# Patient Record
Sex: MALE | Race: WHITE | Employment: OTHER | URBAN - METROPOLITAN AREA
[De-identification: names, ages, dates, MRNs, and addresses within clinical notes are randomized per-mention and may not be internally consistent; named-entity substitution may affect disease eponyms.]

---

## 2017-03-23 ENCOUNTER — OFFICE VISIT (OUTPATIENT)
Dept: ORTHOPEDIC SURGERY | Age: 57
End: 2017-03-23

## 2017-03-23 VITALS
TEMPERATURE: 96.8 F | DIASTOLIC BLOOD PRESSURE: 66 MMHG | HEIGHT: 72 IN | SYSTOLIC BLOOD PRESSURE: 112 MMHG | HEART RATE: 82 BPM | BODY MASS INDEX: 30.48 KG/M2 | WEIGHT: 225 LBS | RESPIRATION RATE: 19 BRPM

## 2017-03-23 DIAGNOSIS — R20.0 LEG NUMBNESS: ICD-10-CM

## 2017-03-23 DIAGNOSIS — M54.50 NONSPECIFIC PAIN IN THE LUMBAR REGION: ICD-10-CM

## 2017-03-23 DIAGNOSIS — M54.42 ACUTE LEFT-SIDED LOW BACK PAIN WITH LEFT-SIDED SCIATICA: Primary | ICD-10-CM

## 2017-03-23 RX ORDER — METHYLPREDNISOLONE 4 MG/1
4 TABLET ORAL
COMMUNITY
End: 2017-05-11

## 2017-03-23 RX ORDER — TRIAMCINOLONE ACETONIDE 1 MG/G
CREAM TOPICAL
COMMUNITY
Start: 2017-02-08

## 2017-03-23 RX ORDER — ESCITALOPRAM OXALATE 5 MG/1
5 TABLET ORAL DAILY
COMMUNITY
Start: 2017-03-21

## 2017-03-23 RX ORDER — GABAPENTIN 400 MG/1
400 CAPSULE ORAL
COMMUNITY
Start: 2017-02-16

## 2017-03-23 RX ORDER — FLUTICASONE PROPIONATE 50 MCG
2 SPRAY, SUSPENSION (ML) NASAL
COMMUNITY
Start: 2017-02-16

## 2017-03-23 RX ORDER — GABAPENTIN 300 MG/1
CAPSULE ORAL
COMMUNITY
Start: 2017-01-20 | End: 2017-05-11

## 2017-03-23 NOTE — PATIENT INSTRUCTIONS
Sciatica: Care Instructions  Your Care Instructions    Sciatica (say \"zeq-ZO-nx-kuh\") is an irritation of one of the sciatic nerves, which come from the spinal cord in the lower back. The sciatic nerves and their branches extend down through the buttock to the foot. Sciatica can develop when an injured disc in the back presses against a spinal nerve root. Its main symptom is pain, numbness, or weakness that is often worse in the leg or foot than in the back. Sciatica often will improve and go away with time. Early treatment usually includes medicines and exercises to relieve pain. Follow-up care is a key part of your treatment and safety. Be sure to make and go to all appointments, and call your doctor if you are having problems. It's also a good idea to know your test results and keep a list of the medicines you take. How can you care for yourself at home? · Take pain medicines exactly as directed. ¨ If the doctor gave you a prescription medicine for pain, take it as prescribed. ¨ If you are not taking a prescription pain medicine, ask your doctor if you can take an over-the-counter medicine. · Use heat or ice to relieve pain. ¨ To apply heat, put a warm water bottle, heating pad set on low, or warm cloth on your back. Do not go to sleep with a heating pad on your skin. ¨ To use ice, put ice or a cold pack on the area for 10 to 20 minutes at a time. Put a thin cloth between the ice and your skin. · Avoid sitting if possible, unless it feels better than standing. · Alternate lying down with short walks. Increase your walking distance as you are able to without making your symptoms worse. · Do not do anything that makes your symptoms worse. When should you call for help? Call 911 anytime you think you may need emergency care. For example, call if:  · You are unable to move a leg at all.   Call your doctor now or seek immediate medical care if:  · You have new or worse symptoms in your legs or buttocks. Symptoms may include:  ¨ Numbness or tingling. ¨ Weakness. ¨ Pain. · You lose bladder or bowel control. Watch closely for changes in your health, and be sure to contact your doctor if:  · You are not getting better as expected. Where can you learn more? Go to http://cookie-mario.info/. Enter [phone] in the search box to learn more about \"Sciatica: Care Instructions. \"  Current as of: May 23, 2016  Content Version: 11.1  © 1554-7418 [employer]. Care instructions adapted under license by [employer] (which disclaims liability or warranty for this information). If you have questions about a medical condition or this instruction, always ask your healthcare professional. [address] any warranty or liability for your use of this information.

## 2017-03-23 NOTE — MR AVS SNAPSHOT
Visit Information Date & Time Provider Department Dept. Phone Encounter #  
 3/23/2017 10:00 AM Isrrael Paul MD [facility] [phone] [id number] Follow-up Instructions Return for MRI/CT f/u. Upcoming Health Maintenance Date Due Hepatitis C Screening 1960 HEMOGLOBIN A1C Q6M 1960 LIPID PANEL Q1 1960 FOOT EXAM Q1 7/21/1969 MICROALBUMIN Q1 7/21/1969 EYE EXAM RETINAL OR DILATED Q1 7/21/1969 Pneumococcal 19-64 Medium Risk (1 of 1 - PPSV23) 7/21/1978 DTaP/Tdap/Td series (1 - Tdap) 7/21/1980 FOBT Q 1 YEAR AGE 50-75 7/21/2009 INFLUENZA AGE 9 TO ADULT 8/1/2016 Allergies as of 3/23/2017  Review Complete On: 3/23/2017 By: Alix Evans LPN Severity Noted Reaction Type Reactions Morphine Medium 04/30/2013   Side Effect Other (comments) Flushing in the face and increases the blood pressure Current Immunizations  Never Reviewed No immunizations on file. Not reviewed this visit You Were Diagnosed With   
  
 Codes Comments Acute left-sided low back pain with left-sided sciatica    -  Primary ICD-10-CM: M54.42 
ICD-9-CM: 724.2, 724.3 Nonspecific pain in the lumbar region     ICD-10-CM: M54.5 ICD-9-CM: 724.2 Vitals BP Pulse Temp Resp Height(growth percentile) Weight(growth percentile) 112/66 (BP 1 Location: Left arm, BP Patient Position: Sitting) 82 96.8 °F (36 °C) (Oral) 19 6' (1.829 m) 225 lb (102.1 kg) BMI Smoking Status 30.52 kg/m2 Former Smoker BMI and BSA Data Body Mass Index Body Surface Area 30.52 kg/m 2 2.28 m 2 Preferred Pharmacy Pharmacy Name Phone [facility] [phone] Your Updated Medication List  
  
   
This list is accurate as of: 3/23/17 10:35 AM.  Always use your most recent med list.  
  
  
  
  
 aspirin 81 mg tablet Take 81 mg by mouth daily. buPROPion  mg SR tablet Commonly known as:  Sheri [place]oka Take 150 mg by mouth daily. ergocalciferol 50,000 unit capsule Commonly known as:  ERGOCALCIFEROL Take 50,000 Units by mouth every seven (7) days. escitalopram oxalate 5 mg tablet Commonly known as:  LEXAPRO  
  
 fluticasone 50 mcg/actuation nasal spray Commonly known as:  FLONASE  
  
 * gabapentin 300 mg capsule Commonly known as:  NEURONTIN  
  
 * gabapentin 400 mg capsule Commonly known as:  NEURONTIN  
  
 ibuprofen 800 mg tablet Commonly known as:  MOTRIN Take 800 mg by mouth every eight (8) hours as needed. * LIPITOR 40 mg tablet Generic drug:  atorvastatin Take 80 mg by mouth nightly. * atorvastatin 80 mg tablet Commonly known as:  LIPITOR Take 80 mg by mouth daily. lisinopril 5 mg tablet Commonly known as:  Jenny Loss Take 5 mg by mouth daily. LOPID 600 mg tablet Generic drug:  gemfibrozil Take 600 mg by mouth two (2) times a day. meloxicam 15 mg tablet Commonly known as:  MOBIC Take 15 mg by mouth daily. metFORMIN 500 mg tablet Commonly known as:  GLUCOPHAGE Take 500 mg by mouth two (2) times daily (with meals). methylPREDNISolone 4 mg Tab Commonly known as:  MEDROL  
4 mg.  
  
 metoprolol tartrate 25 mg tablet Commonly known as:  LOPRESSOR Take 25 mg by mouth daily. multivitamin tablet Commonly known as:  ONE A DAY Take 1 Tab by mouth daily. oxyCODONE IR 5 mg immediate release tablet Commonly known as:  Nancy Canal Take 2 Tabs by mouth every four (4) hours as needed for Pain. PERCOCET 7.5-325 mg per tablet Generic drug:  oxyCODONE-acetaminophen Take 1 Tab by mouth every six (6) hours as needed. tadalafil 5 mg tablet Commonly known as:  CIALIS Take 1 Tab by mouth as needed.  Indications: ERECTILE DYSFUNCTION, SYMPTOMATIC BENIGN PROSTATIC HYPERPLASIA, alpha blockers not effective for relief of prostate sx  
  
 tamsulosin 0.4 mg capsule Commonly known as:  FLOMAX Take 1 Cap by mouth daily. triamcinolone acetonide 0.1 % topical cream  
Commonly known as:  KENALOG * Notice: This list has 4 medication(s) that are the same as other medications prescribed for you. Read the directions carefully, and ask your doctor or other care provider to review them with you. We Performed the Following AMB POC XRAY, SPINE, LUMBOSACRAL; 4+ [id number] CPT(R)] Follow-up Instructions Return for MRI/CT f/u. To-Do List   
 03/23/2017 Imaging:  MRI LUMB SPINE WO CONT Referral Information Referral ID Referred By Referred To  
  
 [id number] Gaudencio Mor Not Available Visits Status Start Date End Date 1 New Request 3/23/17 3/23/18 If your referral has a status of pending review or denied, additional information will be sent to support the outcome of this decision. Patient Instructions Sciatica: Care Instructions Your Care Instructions Sciatica (say \"lxv-AQ-kz-kuh\") is an irritation of one of the sciatic nerves, which come from the spinal cord in the lower back. The sciatic nerves and their branches extend down through the buttock to the foot. Sciatica can develop when an injured disc in the back presses against a spinal nerve root. Its main symptom is pain, numbness, or weakness that is often worse in the leg or foot than in the back. Sciatica often will improve and go away with time. Early treatment usually includes medicines and exercises to relieve pain. Follow-up care is a key part of your treatment and safety. Be sure to make and go to all appointments, and call your doctor if you are having problems. It's also a good idea to know your test results and keep a list of the medicines you take. How can you care for yourself at home? · Take pain medicines exactly as directed. ¨ If the doctor gave you a prescription medicine for pain, take it as prescribed. ¨ If you are not taking a prescription pain medicine, ask your doctor if you can take an over-the-counter medicine. · Use heat or ice to relieve pain. ¨ To apply heat, put a warm water bottle, heating pad set on low, or warm cloth on your back. Do not go to sleep with a heating pad on your skin. ¨ To use ice, put ice or a cold pack on the area for 10 to 20 minutes at a time. Put a thin cloth between the ice and your skin. · Avoid sitting if possible, unless it feels better than standing. · Alternate lying down with short walks. Increase your walking distance as you are able to without making your symptoms worse. · Do not do anything that makes your symptoms worse. When should you call for help? Call 911 anytime you think you may need emergency care. For example, call if: 
· You are unable to move a leg at all. Call your doctor now or seek immediate medical care if: 
· You have new or worse symptoms in your legs or buttocks. Symptoms may include: ¨ Numbness or tingling. ¨ Weakness. ¨ Pain. · You lose bladder or bowel control. Watch closely for changes in your health, and be sure to contact your doctor if: 
· You are not getting better as expected. Where can you learn more? Go to http://cookie-mario.info/. Enter [phone] in the search box to learn more about \"Sciatica: Care Instructions. \" Current as of: May 23, 2016 Content Version: 11.1 © 6325-6055 [employer]. Care instructions adapted under license by [employer] (which disclaims liability or warranty for this information). If you have questions about a medical condition or this instruction, always ask your healthcare professional. [address] any warranty or liability for your use of this information. Introducing [facility]! Dear Alejandro Arevalo: Thank you for requesting a [employer] account. Our records indicate that you already have an active [employer] account. You can access your account anytime at [email or web address] Did you know that you can access your hospital and ER discharge instructions at any time in [employer]? You can also review all of your test results from your hospital stay or ER visit. Additional Information If you have questions, please visit the Frequently Asked Questions section of the [employer] website at [email or web address] Remember, [employer] is NOT to be used for urgent needs. For medical emergencies, dial 911. Now available from your iPhone and Android! Please provide this summary of care documentation to your next provider. If you have any questions after today's visit, please call [phone].

## 2017-03-23 NOTE — PROGRESS NOTES
[address]  [address], [address]  [place]  Phone: [phone]  Fax: [phone]        Oral Cano  : 1960  PCP: No primary care provider on file. NEW PATIENT      ASSESSMENT AND PLAN     Casper Lieberman was seen today for back pain. Diagnoses and all orders for this visit:    Acute left-sided low back pain with left-sided sciatica, recurrent  -     MRI LUMB SPINE WO CONT; Future    Nonspecific pain in the lumbar region  -     [92808] LS Spine 4V    Leg numbness    1. Lumbar spine MRI. 2. Discussed fall prevention with pt.   3. Will obtain EMG from Dr. Alix Rg, has some symptoms of peroneal neuropathy. Follow-up Disposition:  Return for MRI/CT f/u. CHIEF COMPLAINT  Nevin Dougherty is seen today in consultation as a self referral for complaints of increasing back pain. HISTORY OF PRESENT ILLNESS  Nevin Dougherty is a 61 y.o. male. Pt was last seen by me in 2013. In 2013 he had a LT L4-5 laminectomy. His back pain started to increase for the past 2-3 months. Pt reports that he has been experiencing increasing paresthesia in his LLE. His paresthesia is mainly felt below his knee. He states that his back pain is starting to increase as well. He continues to have a drop foot in his LT foot. He wears an AFO sometimes. 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 He has suffered from a fall when his LT foot did not come up and it caught on a step. He states that he has been tripping more often and notes that his LT foot has been catching more often as well. He states that if he looks over his shoulder and picks up his RT foot, his back pain will shoot to a 8-9/10. Pt states that he will pick his LT leg up higher to prevent his foot from catching. When he gets tired, his foot will drag more often. No saddle paresthesia.      Pt takes Gabapentin 400 mg that he takes for anxiety and panic attacks. Pt was told that there is no way he can have PTSD as he has never been to [place], now recently diagnosed with PTSD. Dagoberto Saldivar He will suffer from panic attacks with watching explosions in movies or loud noises. Pt has been on Gabapentin for about 6 months. He has not noticed a difference in his symptoms since starting Gabapentin. He has had an EMG of his LLE by Dr. Janice Ulloa, unsure of results-?chronic . He has not been given Prednisone since the onset of his increased pain and paresthesias. Denies persistent fevers, chills, weight changes, neurogenic bowel or bladder symptoms. Pt denies recent ED visits or hospitalizations. Pt has had a shoulder reconstruction. Pain Assessment  3/23/2017   Location of Pain Back   Severity of Pain 3   Quality of Pain Aching   Frequency of Pain Constant   Aggravating Factors Bending;Standing;Squatting   Relieving Factors Nothing         PAST MEDICAL HISTORY   Past Medical History:   Diagnosis Date    Acid indigestion     Chronic pain     arthritis    Diabetes ([address]     Esophageal hiatal hernia     GERD (gastroesophageal reflux disease)     Hypercholesteremia     Hypertension     Unspecified sleep apnea     had surgery for this       Past Surgical History:   Procedure Laterality Date    HX BACK SURGERY Left 05/2013    LL4/5 drakei Dr. Anahy Gibbons    HX HEENT      U triple 3 tonsillectomy and adnoidectomy    HX HEENT      2 deviated septum surgeries    HX OTHER SURGICAL      2nd grade had lymph node removed       MEDICATIONS      Current Outpatient Prescriptions   Medication Sig Dispense Refill    escitalopram oxalate (LEXAPRO) 5 mg tablet       fluticasone (FLONASE) 50 mcg/actuation nasal spray       gabapentin (NEURONTIN) 400 mg capsule       ergocalciferol (ERGOCALCIFEROL) 50,000 unit capsule Take 50,000 Units by mouth every seven (7) days.  atorvastatin (LIPITOR) 80 mg tablet Take 80 mg by mouth daily.       multivitamin (ONE A DAY) tablet Take 1 Tab by mouth daily.  tadalafil (CIALIS) 5 mg tablet Take 1 Tab by mouth as needed. Indications: ERECTILE DYSFUNCTION, SYMPTOMATIC BENIGN PROSTATIC HYPERPLASIA, alpha blockers not effective for relief of prostate sx 30 Tab 11    buPROPion SR (WELLBUTRIN SR) 150 mg SR tablet Take 150 mg by mouth daily.  tamsulosin (FLOMAX) 0.4 mg capsule Take 1 Cap by mouth daily. 30 Cap 3    metoprolol (LOPRESSOR) 25 mg tablet Take 25 mg by mouth daily.  metFORMIN (GLUCOPHAGE) 500 mg tablet Take 500 mg by mouth two (2) times daily (with meals).  lisinopril (PRINIVIL, ZESTRIL) 5 mg tablet Take 5 mg by mouth daily.  aspirin 81 mg tablet Take 81 mg by mouth daily.  ibuprofen (MOTRIN) 800 mg tablet Take 800 mg by mouth every eight (8) hours as needed.  methylPREDNISolone (MEDROL) 4 mg tab 4 mg.  gabapentin (NEURONTIN) 300 mg capsule       triamcinolone acetonide (KENALOG) 0.1 % topical cream       meloxicam (MOBIC) 15 mg tablet Take 15 mg by mouth daily.  oxyCODONE IR (ROXICODONE) 5 mg immediate release tablet Take 2 Tabs by mouth every four (4) hours as needed for Pain. 40 Tab 0    gemfibrozil (LOPID) 600 mg tablet Take 600 mg by mouth two (2) times a day.  atorvastatin (LIPITOR) 40 mg tablet Take 80 mg by mouth nightly.  oxyCODONE-acetaminophen (PERCOCET) 7.5-325 mg per tablet Take 1 Tab by mouth every six (6) hours as needed. ALLERGIES    Allergies   Allergen Reactions    Morphine Other (comments)     Flushing in the face and increases the blood pressure          SOCIAL HISTORY    Social History     Social History    Marital status:      Spouse name: N/A    Number of children: N/A    Years of education: N/A     Occupational History    Not on file.      Social History Main Topics    Smoking status: Former Smoker     Years: 25.00     Quit date: 8/10/2015    Smokeless tobacco: Never Used    Alcohol use Yes      Comment: occasionally    Drug use: No    Sexual activity: Not on file     Other Topics Concern    Not on file     Social History Narrative       FAMILY HISTORY    Family History   Problem Relation Age of Onset    Cancer Mother     Stroke Neg Hx     Heart Surgery Neg Hx     Heart Attack Neg Hx     Hypertension Neg Hx          REVIEW OF SYSTEMS  Review of Systems   Constitutional: Negative for chills, fever and weight loss. Respiratory: Negative for shortness of breath. Cardiovascular: Negative for chest pain. Gastrointestinal: Negative for constipation. Negative for fecal incontinence   Genitourinary: Negative for dysuria. Negative for urinary incontinence   Musculoskeletal:        Per HPI   Skin: Negative for rash. Neurological: Positive for tingling and focal weakness. Negative for dizziness, tremors and headaches. Endo/Heme/Allergies: Does not bruise/bleed easily. Psychiatric/Behavioral: The patient does not have insomnia. PHYSICAL EXAMINATION  Visit Vitals    /66 (BP 1 Location: Left arm, BP Patient Position: Sitting)    Pulse 82    Temp 96.8 °F (36 °C) (Oral)    Resp 19    Ht 6' (1.829 m)    Wt 225 lb (102.1 kg)    BMI 30.52 kg/m2          Accompanied by self. Constitutional:  Well developed, well nourished, in no acute distress. Psychiatric: Affect and mood are appropriate. Integumentary: No rashes or abrasions noted on exposed areas. Cardiovascular/Peripheral Vascular: Intact l pulses. No peripheral edema is noted. Lymphatic:  No evidence of lymphedema. No cervical lymphadenopathy. SPINE/MUSCULOSKELETAL EXAM      Lumbar spine:  No rash, ecchymosis, or gross obliquity. No fasciculations. No focal atrophy is noted. Increased pain with LT lateral bending. No tenderness to palpation at the sciatic notch. SI joints non-tender. Trochanters non tender. Diminished sensation to light touch of lateral calf. Positive tinel's at LT fibular head.        MOTOR: Hip Flex  Quads Hamstrings Ankle DF EHL Ankle PF   Right +4/5 +4/5 +4/5 +4/5 +4/5 +4/5   Left +4/5 +4/5 +4/5 -4/5 3/5 +4/5     Straight Leg raise negative. LT foot drop. Toe rise intact    Ambulation without assistive device. FWB. RADIOGRAPHS  Lumbar spine xray films reviewed:  1) Mild Degenerative scoliosis  2) Disc space narrowing at L3-4 and L4-5  3) Facet changes at L3-4 and L4-5  4) No instability    Written by Robin Cruz, as dictated by Dedrick Patterson MD.    Mr. Leandra Hermosillo may have a reminder for a \"due or due soon\" health maintenance. I have asked that he contact his primary care provider for follow-up on this health maintenance.

## 2017-04-10 ENCOUNTER — HOSPITAL ENCOUNTER (OUTPATIENT)
Age: 57
Discharge: HOME | End: 2017-04-10
Attending: PHYSICAL MEDICINE & REHABILITATION
Payer: OTHER GOVERNMENT

## 2017-04-10 DIAGNOSIS — M54.42 ACUTE LEFT-SIDED LOW BACK PAIN WITH LEFT-SIDED SCIATICA: ICD-10-CM

## 2017-04-10 PROCEDURE — 72148 MRI LUMBAR SPINE W/O DYE: CPT

## 2017-05-11 ENCOUNTER — OFFICE VISIT (OUTPATIENT)
Dept: ORTHOPEDIC SURGERY | Age: 57
End: 2017-05-11

## 2017-05-11 VITALS
WEIGHT: 226 LBS | BODY MASS INDEX: 30.61 KG/M2 | DIASTOLIC BLOOD PRESSURE: 82 MMHG | HEART RATE: 75 BPM | SYSTOLIC BLOOD PRESSURE: 128 MMHG | HEIGHT: 72 IN

## 2017-05-11 DIAGNOSIS — M47.819 FACET ARTHROPATHY, MULTILEVEL: Primary | Chronic | ICD-10-CM

## 2017-05-11 RX ORDER — ETODOLAC 400 MG/1
TABLET, FILM COATED ORAL
Qty: 90 TAB | Refills: 0 | Status: SHIPPED | OUTPATIENT
Start: 2017-05-11 | End: 2017-06-11 | Stop reason: SDUPTHER

## 2017-05-11 NOTE — PATIENT INSTRUCTIONS
Learning About Medial Branch Block and Neurotomy  What are medial branch block and neurotomy? Facet joints connect your vertebrae to each other. Problems in these joints can cause chronic (long-term) pain in the neck or back. They can sometimes affect the shoulders, arms, buttocks, or legs. Medial branch nerves are the nerves that carry many of the pain messages from your facet joints. Radiofrequency medial branch neurotomy is a type of medial branch neurotomy that is used to relieve arthritis pain. It uses radio waves to damage nerves in your neck or back so that they can no longer send pain messages to your brain. Before your doctor knows if a neurotomy will help you, he or she will do a medial branch block to find out if certain nerves are the ones that are a source of your pain. You will need two separate visits to the outpatient center or hospital to have both procedures. How is a medial branch block done? The doctor will use a tiny needle to numb the skin where you will get the block. Then he or she puts the block needle into the numbed area. You may feel some pressure, but you should not feel pain. Using fluoroscopy (live X-ray) to guide the needle, the doctor injects medicine onto one or more nerves to make them numb. If you get relief from your pain in the next 4 to 6 hours, it's a sign that those nerves may be contributing to your pain. The relief will last only a short time. You may then have a medial branch neurotomy at a later visit to try to get longer relief. It takes 20 to 30 minutes to get the block. You can go home after the doctor watches you for about an hour. You will get instructions on how to report how much pain you have when you are at home. You will need someone to drive you home. How is medial branch neurotomy done? The doctor will use a tiny needle to numb the skin where you will get the neurotomy. Then he or she puts the neurotomy needle into the numbed area.  You may feel some pressure. Using fluoroscopy (live X-ray) to guide the needle, the doctor sends radio waves through the needle to the nerve for 60 to 90 seconds. The radio waves heat the nerve, which damages it. The doctor may do this several times. And he or she may treat more than one nerve. It takes 45 to 90 minutes to get a neurotomy, depending on how many nerves are heated. You will probably go home 30 to 60 minutes later. You will need someone to drive you home. What can you expect after a neurotomy? You may feel a little sore or tender at the injection site at first. But after a successful neurotomy, most people have pain relief right away. It often lasts for 9 to 12 months or longer. Sometimes the pain relief is permanent. If your pain does come back, it may mean that the damaged nerve has healed and can send pain messages again. Or it can mean that a different nerve is causing pain. Your doctor will discuss your options with you. Follow-up care is a key part of your treatment and safety. Be sure to make and go to all appointments, and call your doctor if you are having problems. It's also a good idea to know your test results and keep a list of the medicines you take. Where can you learn more? Go to http://cookie-mario.info/. Enter Z555 in the search box to learn more about \"Learning About Medial Branch Block and Neurotomy. \"  Current as of: October 14, 2016  Content Version: 11.2  © 0547-0671 "[employer] Care instructions adapted under license by [employer] (which disclaims liability or warranty for this information). If you have questions about a medical condition or this instruction, always ask your healthcare professional. [address] any warranty or liability for your use of this information. Low Back Arthritis: Exercises  Your Care Instructions  Here are some examples of typical rehabilitation exercises for your condition. Start each exercise slowly. Ease off the exercise if you start to have pain. Your doctor or physical therapist will tell you when you can start these exercises and which ones will work best for you. When you are not being active, find a comfortable position for rest. Some people are comfortable on the floor or a medium-firm bed with a small pillow under their head and another under their knees. Some people prefer to lie on their side with a pillow between their knees. Don't stay in one position for too long. Take short walks (10 to 20 minutes) every 2 to 3 hours. Avoid slopes, hills, and stairs until you feel better. Walk only distances you can manage without pain, especially leg pain. How to do the exercises  Pelvic tilt    1. Lie on your back with your knees bent. 2. \"Brace\" your stomach--tighten your muscles by pulling in and imagining your belly button moving toward your spine. 3. Press your lower back into the floor. You should feel your hips and pelvis rock back. 4. Hold for 6 seconds while breathing smoothly. 5. Relax and allow your pelvis and hips to rock forward. 6. Repeat 8 to 12 times. Back stretches    1. Get down on your hands and knees on the floor. 2. Relax your head and allow it to droop. Round your back up toward the ceiling until you feel a nice stretch in your upper, middle, and lower back. Hold this stretch for as long as it feels comfortable, or about 15 to 30 seconds. 3. Return to the starting position with a flat back while you are on your hands and knees. 4. Let your back sway by pressing your stomach toward the floor. Lift your buttocks toward the ceiling. 5. Hold this position for 15 to 30 seconds. 6. Repeat 2 to 4 times. Follow-up care is a key part of your treatment and safety. Be sure to make and go to all appointments, and call your doctor if you are having problems. It's also a good idea to know your test results and keep a list of the medicines you take.   Where can you learn more? Go to http://cookie-mario.info/. Enter M056 in the search box to learn more about \"Low Back Arthritis: Exercises. \"  Current as of: May 23, 2016  Content Version: 11.2  © 7412-5100 [employer]. Care instructions adapted under license by [employer] (which disclaims liability or warranty for this information). If you have questions about a medical condition or this instruction, always ask your healthcare professional. [address] any warranty or liability for your use of this information. Low Back Pain: Exercises  Your Care Instructions  Here are some examples of typical rehabilitation exercises for your condition. Start each exercise slowly. Ease off the exercise if you start to have pain. Your doctor or physical therapist will tell you when you can start these exercises and which ones will work best for you. How to do the exercises  Press-up    7. Lie on your stomach, supporting your body with your forearms. 8. Press your elbows down into the floor to raise your upper back. As you do this, relax your stomach muscles and allow your back to arch without using your back muscles. As your press up, do not let your hips or pelvis come off the floor. 9. Hold for 15 to 30 seconds, then relax. 10. Repeat 2 to 4 times. Alternate arm and leg (bird dog) exercise    Note: Do this exercise slowly. Try to keep your body straight at all times, and do not let one hip drop lower than the other. 7. Start on the floor, on your hands and knees. 8. Tighten your belly muscles. 9. Raise one leg off the floor, and hold it straight out behind you. Be careful not to let your hip drop down, because that will twist your trunk. 10. Hold for about 6 seconds, then lower your leg and switch to the other leg. 11. Repeat 8 to 12 times on each leg. 12. Over time, work up to holding for 10 to 30 seconds each time.   13. If you feel stable and secure with your leg raised, try raising the opposite arm straight out in front of you at the same time. Knee-to-chest exercise    1. Lie on your back with your knees bent and your feet flat on the floor. 2. Bring one knee to your chest, keeping the other foot flat on the floor (or keeping the other leg straight, whichever feels better on your lower back). 3. Keep your lower back pressed to the floor. Hold for at least 15 to 30 seconds. 4. Relax, and lower the knee to the starting position. 5. Repeat with the other leg. Repeat 2 to 4 times with each leg. 6. To get more stretch, put your other leg flat on the floor while pulling your knee to your chest.  Curl-ups    1. Lie on the floor on your back with your knees bent at a 90-degree angle. Your feet should be flat on the floor, about 12 inches from your buttocks. 2. Cross your arms over your chest. If this bothers your neck, try putting your hands behind your neck (not your head), with your elbows spread apart. 3. Slowly tighten your belly muscles and raise your shoulder blades off the floor. 4. Keep your head in line with your body, and do not press your chin to your chest.  5. Hold this position for 1 or 2 seconds, then slowly lower yourself back down to the floor. 6. Repeat 8 to 12 times. Pelvic tilt exercise    1. Lie on your back with your knees bent. 2. \"Brace\" your stomach. This means to tighten your muscles by pulling in and imagining your belly button moving toward your spine. You should feel like your back is pressing to the floor and your hips and pelvis are rocking back. 3. Hold for about 6 seconds while you breathe smoothly. 4. Repeat 8 to 12 times. Heel dig bridging    1. Lie on your back with both knees bent and your ankles bent so that only your heels are digging into the floor. Your knees should be bent about 90 degrees.   2. Then push your heels into the floor, squeeze your buttocks, and lift your hips off the floor until your shoulders, hips, and knees are all in a straight line. 3. Hold for about 6 seconds as you continue to breathe normally, and then slowly lower your hips back down to the floor and rest for up to 10 seconds. 4. Do 8 to 12 repetitions. Hamstring stretch in doorway    1. Lie on your back in a doorway, with one leg through the open door. 2. Slide your leg up the wall to straighten your knee. You should feel a gentle stretch down the back of your leg. 3. Hold the stretch for at least 15 to 30 seconds. Do not arch your back, point your toes, or bend either knee. Keep one heel touching the floor and the other heel touching the wall. 4. Repeat with your other leg. 5. Do 2 to 4 times for each leg. Hip flexor stretch    1. Kneel on the floor with one knee bent and one leg behind you. Place your forward knee over your foot. Keep your other knee touching the floor. 2. Slowly push your hips forward until you feel a stretch in the upper thigh of your rear leg. 3. Hold the stretch for at least 15 to 30 seconds. Repeat with your other leg. 4. Do 2 to 4 times on each side. Wall sit    1. Stand with your back 10 to 12 inches away from a wall. 2. Lean into the wall until your back is flat against it. 3. Slowly slide down until your knees are slightly bent, pressing your lower back into the wall. 4. Hold for about 6 seconds, then slide back up the wall. 5. Repeat 8 to 12 times. Follow-up care is a key part of your treatment and safety. Be sure to make and go to all appointments, and call your doctor if you are having problems. It's also a good idea to know your test results and keep a list of the medicines you take. Where can you learn more? Go to http://cookie-mario.info/. Enter I793 in the search box to learn more about \"Low Back Pain: Exercises. \"  Current as of: May 23, 2016  Content Version: 11.2  © 5926-5637 [email or web address].  Care instructions adapted under license by [employer] (which disclaims liability or warranty for this information). If you have questions about a medical condition or this instruction, always ask your healthcare professional. [address] any warranty or liability for your use of this information.

## 2017-05-11 NOTE — PROGRESS NOTES
[address]  [address], [address]  [place]  Phone: [phone]  Fax: [phone]        Jerry Alonso  : 1960  PCP: PROVIDER UNKNOWN    PROGRESS NOTE      ASSESSMENT AND PLAN    Tori Reyna was seen today for back pain and follow-up. Diagnoses and all orders for this visit:    Facet arthropathy, multilevel ([address] L3/4 w/canal triangulation, L4/5    Other orders  -     etodolac (LODINE) 400 mg tablet; Take one tab PO every day to TID with food as needed for pain. 1. Trial of Lodine. 2. Continue Gabapentin. 3. Discussed urgent symptoms with pt and RFA. 4. Released from specialty care. Further refills through PCP. 5. Given information on RFA. Follow-up Disposition:  Return if symptoms worsen or fail to improve. HISTORY OF PRESENT ILLNESS  Ana Noe is a 61 y.o. male. Pt presents to the office for a f/u visit for LT sciatica. Last visit pt was sent to have a lumbar spine MRI. Images reviewed with the pt. Primarily facet changes. Pt repots that today his pain is mainly in his back. He does continue to have LT sciatic pain but his pain is most severe in his back today. Pt reports that he has had a LT drop foot that is not a new symptom for him as he has had this for a prolonged period of time. Pt reports that he does experience sharp shooting pain up his back. He also experiences pain in his RLE intermittently. Pt has difficulty with rising from a seated position. He has trouble with bending and rising from a bent position due to pain. Pt does have an AFO at home that he will use for his LLE weakness. He does not use his AFO often, is not wearing his AFO in the office today. He is unable to wear his AFO [place] on the [place] as he is unable to feel the ground when wearing. He does drag his LLE with ambulation when he is fatigued. No saddle paresthesia. He takes Gabapentin 400 mg TID .  He has not noticed any change in his pain since starting Gabapentin. Denies persistent fevers, chills, weight changes, neurogenic bowel or bladder symptoms. Pt denies recent ED visits or hospitalizations. Motrin no longer helpful for LBP    Pain Assessment  5/11/2017   Location of Pain -   Severity of Pain -   Quality of Pain Sharp   Frequency of Pain -   Aggravating Factors -   Limiting Behavior -   Relieving Factors -   Result of Injury -           MRI Results (most recent):    Results from Hospital Encounter encounter on 04/10/17   MRI LUMB SPINE WO CONT   Narrative Procedure: MRI of the lumbar spine without contrast.    CPT code: 44657    Comparisons: MRI dated March 30, 2013. Indications:  recurrent low back pain with paresthesias in the left lower  extremity    Technique: T1 weighted, T2 FSE with fat saturation, FSE inversion recovery  sagittal images are supplemented by T2 weighted with fat saturation and T1  weighted axial images. Findings:        Sagittal images reveal overall normal vertebral body morphology. No fractures  noted. No suspicious lesions. Alignments are anatomic, no evidence for subluxation. Conus medullaris ends at the L1 vertebral body level. Correlation of axial and sagittal images reveals the following: At T12-L1: No significant disc pathology. No significant facet arthropathy. No  central canal or foraminal stenosis. At L1-L2: Minimal circumferential disc bulge. No significant facet arthropathy. No central canal or foraminal stenosis. At L2-L3: Mild circumferential disc bulge with a right posterior lateral corner  annular tear. Minimal facet arthropathy. No central canal stenosis. Minimal  foraminal stenosis. At L3-L4: Mild to moderate circumferential disc bulge. Mild to moderate facet  arthropathy and buckling of the ligamentum flavum. No central canal stenosis. Mild to moderate left and mild right foraminal stenosis.     At L4-L5: Mild to moderate broad-based disc osteophyte complex with some annular  tearing centrally. Remote left laminectomy and partial facetectomy. The  ligamentum flavum on the right does flatten the thecal sac somewhat but probably  not symptomatic. No central canal stenosis by measurement of 14 mm. Moderate  right and mild left foraminal stenosis    At L5-S1: No significant disc pathology. No significant facet arthropathy. No  central canal or foraminal stenosis. Visualized portions of the sacroiliac joints are unremarkable. Probable simple  cyst not well visualized posterior mid left kidney. Incidentally imaged  retroperitoneal structures are otherwise unremarkable. Impression Impression:    Degenerative and remote postoperative changes as above. PAST MEDICAL HISTORY   Past Medical History:   Diagnosis Date    Acid indigestion     Chronic pain     arthritis    Diabetes ([address]     Esophageal hiatal hernia     GERD (gastroesophageal reflux disease)     Hypercholesteremia     Hypertension     Unspecified sleep apnea     had surgery for this       Past Surgical History:   Procedure Laterality Date    HX BACK SURGERY Left 05/2013    LL4/5 [facility]    HX HEENT      U triple 3 tonsillectomy and adnoidectomy    HX HEENT      2 deviated septum surgeries    HX OTHER SURGICAL      2nd grade had lymph node removed   . MEDICATIONS    Current Outpatient Prescriptions   Medication Sig Dispense Refill    escitalopram oxalate (LEXAPRO) 5 mg tablet Take 5 mg by mouth daily.  fluticasone (FLONASE) 50 mcg/actuation nasal spray 2 Sprays by Both Nostrils route.  gabapentin (NEURONTIN) 400 mg capsule Take 400 mg by mouth.  triamcinolone acetonide (KENALOG) 0.1 % topical cream       ergocalciferol (ERGOCALCIFEROL) 50,000 unit capsule Take 50,000 Units by mouth every seven (7) days.  atorvastatin (LIPITOR) 80 mg tablet Take 80 mg by mouth daily.  multivitamin (ONE A DAY) tablet Take 1 Tab by mouth daily.       tadalafil (CIALIS) 5 mg tablet Take 1 Tab by mouth as needed. Indications: ERECTILE DYSFUNCTION, SYMPTOMATIC BENIGN PROSTATIC HYPERPLASIA, alpha blockers not effective for relief of prostate sx 30 Tab 11    buPROPion SR (WELLBUTRIN SR) 150 mg SR tablet Take 150 mg by mouth daily.  tamsulosin (FLOMAX) 0.4 mg capsule Take 1 Cap by mouth daily. 30 Cap 3    metoprolol (LOPRESSOR) 25 mg tablet Take 25 mg by mouth daily.  metFORMIN (GLUCOPHAGE) 500 mg tablet Take 500 mg by mouth two (2) times daily (with meals).  gemfibrozil (LOPID) 600 mg tablet Take 600 mg by mouth two (2) times a day.  lisinopril (PRINIVIL, ZESTRIL) 5 mg tablet Take 5 mg by mouth daily.  aspirin 81 mg tablet Take 81 mg by mouth daily.  ibuprofen (MOTRIN) 800 mg tablet Take 800 mg by mouth every eight (8) hours as needed.  methylPREDNISolone (MEDROL) 4 mg tab 4 mg.  gabapentin (NEURONTIN) 300 mg capsule       meloxicam (MOBIC) 15 mg tablet Take 15 mg by mouth daily.  oxyCODONE IR (ROXICODONE) 5 mg immediate release tablet Take 2 Tabs by mouth every four (4) hours as needed for Pain. 40 Tab 0    atorvastatin (LIPITOR) 40 mg tablet Take 80 mg by mouth nightly.  oxyCODONE-acetaminophen (PERCOCET) 7.5-325 mg per tablet Take 1 Tab by mouth every six (6) hours as needed. ALLERGIES  Allergies   Allergen Reactions    Morphine Other (comments)     Flushing in the face and increases the blood pressure          SOCIAL HISTORY    Social History     Social History    Marital status:      Spouse name: N/A    Number of children: N/A    Years of education: N/A     Occupational History    Not on file.      Social History Main Topics    Smoking status: Former Smoker     Years: 25.00     Quit date: 8/10/2015    Smokeless tobacco: Never Used    Alcohol use Yes      Comment: occasionally    Drug use: No    Sexual activity: Not on file     Other Topics Concern    Not on file     Social History Narrative FAMILY HISTORY  Family History   Problem Relation Age of Onset    Cancer Mother     Stroke Neg Hx     Heart Surgery Neg Hx     Heart Attack Neg Hx     Hypertension Neg Hx        REVIEW OF SYSTEMS  Review of Systems   Constitutional: Negative for chills, fever and weight loss. Respiratory: Negative for shortness of breath. Cardiovascular: Negative for chest pain. Gastrointestinal: Negative for constipation. Negative for fecal incontinence   Genitourinary: Negative for dysuria. Negative for urinary incontinence   Musculoskeletal:        Per HPI   Skin: Negative for rash. Neurological: Positive for focal weakness. Negative for dizziness, tingling, tremors and headaches. Endo/Heme/Allergies: Does not bruise/bleed easily. Psychiatric/Behavioral: The patient does not have insomnia. PHYSICAL EXAMINATION  Visit Vitals    /82    Pulse 75    Ht 6' (1.829 m)    Wt 226 lb (102.5 kg)    BMI 30.65 kg/m2         Accompanied by self. Constitutional:  Well developed, well nourished, in no acute distress. Psychiatric: Affect and mood are appropriate. Integumentary: No rashes or abrasions noted on exposed areas. Cardiovascular/Peripheral Vascular: Intact l pulses. No peripheral edema is noted. Lymphatic:  No evidence of lymphedema. No cervical lymphadenopathy. SPINE/MUSCULOSKELETAL EXAM      Lumbar spine:  No rash, ecchymosis, or gross obliquity. No fasciculations. No focal atrophy is noted. Pain with lumbar extension and RT lateral twisting. Tenderness to palpation on the RT at L3-4 and L4-5. No tenderness to palpation at the sciatic notch. SI joints non-tender. Trochanters non tender. Sensation grossly intact to light touch. MOTOR:       Hip Flex  Quads Hamstrings Ankle DF EHL Ankle PF   Right +4/5 +4/5 +4/5 +4/5 +4/5 +4/5   Left +4/5 +4/5 +4/5 4/5 4/5 +4/5       Straight Leg raise negative. Mild difficulty with tandem gait.      Ambulation without assistive device. FWB. Written by Sofía Sanches, as dictated by Sasha Ziegler MD.    I, Dr. Sasha Ziegler MD, confirm that all documentation is accurate. Mr. Moises Grant may have a reminder for a \"due or due soon\" health maintenance. I have asked that he contact his primary care provider for follow-up on this health maintenance.

## 2017-05-11 NOTE — PROGRESS NOTES
Verbal order entered per Dr Carolynn Noriega as documented on blue sheet: Lodine 400mg take one tab PO every day to TID with food as needed for pain.  Disp 90 no refills

## 2017-05-11 NOTE — MR AVS SNAPSHOT
Visit Information Date & Time Provider Department Dept. Phone Encounter #  
 5/11/2017  8:50 AM Miryam Rico MD [facility] [phone] [id number] Follow-up Instructions Return if symptoms worsen or fail to improve. Upcoming Health Maintenance Date Due Hepatitis C Screening 1960 HEMOGLOBIN A1C Q6M 1959 LIPID PANEL Q1 1959 FOOT EXAM Q1 7/21/1969 MICROALBUMIN Q1 7/21/1969 EYE EXAM RETINAL OR DILATED Q1 7/21/1969 Pneumococcal 19-64 Medium Risk (1 of 1 - PPSV23) 7/21/1978 DTaP/Tdap/Td series (1 - Tdap) 7/21/1980 FOBT Q 1 YEAR AGE 50-75 7/21/2009 INFLUENZA AGE 9 TO ADULT 8/1/2017 Allergies as of 5/11/2017  Review Complete On: 5/11/2017 By: Miryam Rico MD  
  
 Severity Noted Reaction Type Reactions Morphine Medium 04/30/2013   Side Effect Other (comments) Flushing in the face and increases the blood pressure Current Immunizations  Never Reviewed No immunizations on file. Not reviewed this visit You Were Diagnosed With   
  
 Codes Comments Facet arthropathy, multilevel ([facility]    -  Primary ICD-10-CM: M12.88 ICD-9-CM: 721.90 Vitals BP Pulse Height(growth percentile) Weight(growth percentile) BMI Smoking Status 128/82 75 6' (1.829 m) 226 lb (102.5 kg) 30.65 kg/m2 Former Smoker Vitals History BMI and BSA Data Body Mass Index Body Surface Area  
 30.65 kg/m 2 2.28 m 2 Preferred Pharmacy Pharmacy Name Phone [facility] [phone] Your Updated Medication List  
  
   
This list is accurate as of: 5/11/17  9:33 AM.  Always use your most recent med list.  
  
  
  
  
 aspirin 81 mg tablet Take 81 mg by mouth daily. atorvastatin 80 mg tablet Commonly known as:  LIPITOR Take 80 mg by mouth daily. buPROPion  mg SR tablet Commonly known as:  Delta Deal Take 150 mg by mouth daily. ergocalciferol 50,000 unit capsule Commonly known as:  ERGOCALCIFEROL Take 50,000 Units by mouth every seven (7) days. escitalopram oxalate 5 mg tablet Commonly known as:  Kristi Armando Take 5 mg by mouth daily. fluticasone 50 mcg/actuation nasal spray Commonly known as:  Maribel Six 2 Sprays by Both Nostrils route.  
  
 gabapentin 400 mg capsule Commonly known as:  NEURONTIN Take 400 mg by mouth. ibuprofen 800 mg tablet Commonly known as:  MOTRIN Take 800 mg by mouth every eight (8) hours as needed. lisinopril 5 mg tablet Commonly known as:  Gaetano Leys Take 5 mg by mouth daily. LOPID 600 mg tablet Generic drug:  gemfibrozil Take 600 mg by mouth two (2) times a day. metFORMIN 500 mg tablet Commonly known as:  GLUCOPHAGE Take 500 mg by mouth two (2) times daily (with meals). metoprolol tartrate 25 mg tablet Commonly known as:  LOPRESSOR Take 25 mg by mouth daily. multivitamin tablet Commonly known as:  ONE A DAY Take 1 Tab by mouth daily. tadalafil 5 mg tablet Commonly known as:  CIALIS Take 1 Tab by mouth as needed. Indications: ERECTILE DYSFUNCTION, SYMPTOMATIC BENIGN PROSTATIC HYPERPLASIA, alpha blockers not effective for relief of prostate sx  
  
 tamsulosin 0.4 mg capsule Commonly known as:  FLOMAX Take 1 Cap by mouth daily. triamcinolone acetonide 0.1 % topical cream  
Commonly known as:  KENALOG Follow-up Instructions Return if symptoms worsen or fail to improve. Patient Instructions Learning About Medial Branch Block and Neurotomy What are medial branch block and neurotomy? Facet joints connect your vertebrae to each other. Problems in these joints can cause chronic (long-term) pain in the neck or back. They can sometimes affect the shoulders, arms, buttocks, or legs. Medial branch nerves are the nerves that carry many of the pain messages from your facet joints. Radiofrequency medial branch neurotomy is a type of medial branch neurotomy that is used to relieve arthritis pain. It uses radio waves to damage nerves in your neck or back so that they can no longer send pain messages to your brain. Before your doctor knows if a neurotomy will help you, he or she will do a medial branch block to find out if certain nerves are the ones that are a source of your pain. You will need two separate visits to the outpatient center or hospital to have both procedures. How is a medial branch block done? The doctor will use a tiny needle to numb the skin where you will get the block. Then he or she puts the block needle into the numbed area. You may feel some pressure, but you should not feel pain. Using fluoroscopy (live X-ray) to guide the needle, the doctor injects medicine onto one or more nerves to make them numb. If you get relief from your pain in the next 4 to 6 hours, it's a sign that those nerves may be contributing to your pain. The relief will last only a short time. You may then have a medial branch neurotomy at a later visit to try to get longer relief. It takes 20 to 30 minutes to get the block. You can go home after the doctor watches you for about an hour. You will get instructions on how to report how much pain you have when you are at home. You will need someone to drive you home. How is medial branch neurotomy done? The doctor will use a tiny needle to numb the skin where you will get the neurotomy. Then he or she puts the neurotomy needle into the numbed area. You may feel some pressure. Using fluoroscopy (live X-ray) to guide the needle, the doctor sends radio waves through the needle to the nerve for 60 to 90 seconds. The radio waves heat the nerve, which damages it. The doctor may do this several times. And he or she may treat more than one nerve. It takes 45 to 90 minutes to get a neurotomy, depending on how many nerves are heated. You will probably go home 30 to 60 minutes later. You will need someone to drive you home. What can you expect after a neurotomy? You may feel a little sore or tender at the injection site at first. But after a successful neurotomy, most people have pain relief right away. It often lasts for 9 to 12 months or longer. Sometimes the pain relief is permanent. If your pain does come back, it may mean that the damaged nerve has healed and can send pain messages again. Or it can mean that a different nerve is causing pain. Your doctor will discuss your options with you. Follow-up care is a key part of your treatment and safety. Be sure to make and go to all appointments, and call your doctor if you are having problems. It's also a good idea to know your test results and keep a list of the medicines you take. Where can you learn more? Go to http://cookie-mario.info/. Enter D715 in the search box to learn more about \"Learning About Medial Branch Block and Neurotomy. \" Current as of: October 14, 2016 Content Version: 11.2 © 9730-1964 [employer]. Care instructions adapted under license by [employer] (which disclaims liability or warranty for this information). If you have questions about a medical condition or this instruction, always ask your healthcare professional. [place] [address] any warranty or liability for your use of this information. Low Back Arthritis: Exercises Your Care Instructions Here are some examples of typical rehabilitation exercises for your condition. Start each exercise slowly. Ease off the exercise if you start to have pain. Your doctor or physical therapist will tell you when you can start these exercises and which ones will work best for you.  
When you are not being active, find a comfortable position for rest. Some people are comfortable on the floor or a medium-firm bed with a small pillow under their head and another under their knees. Some people prefer to lie on their side with a pillow between their knees. Don't stay in one position for too long. Take short walks (10 to 20 minutes) every 2 to 3 hours. Avoid slopes, hills, and stairs until you feel better. Walk only distances you can manage without pain, especially leg pain. How to do the exercises Pelvic tilt 1. Lie on your back with your knees bent. 2. \"Brace\" your stomachtighten your muscles by pulling in and imagining your belly button moving toward your spine. 3. Press your lower back into the floor. You should feel your hips and pelvis rock back. 4. Hold for 6 seconds while breathing smoothly. 5. Relax and allow your pelvis and hips to rock forward. 6. Repeat 8 to 12 times. Back stretches 1. Get down on your hands and knees on the floor. 2. Relax your head and allow it to droop. Round your back up toward the ceiling until you feel a nice stretch in your upper, middle, and lower back. Hold this stretch for as long as it feels comfortable, or about 15 to 30 seconds. 3. Return to the starting position with a flat back while you are on your hands and knees. 4. Let your back sway by pressing your stomach toward the floor. Lift your buttocks toward the ceiling. 5. Hold this position for 15 to 30 seconds. 6. Repeat 2 to 4 times. Follow-up care is a key part of your treatment and safety. Be sure to make and go to all appointments, and call your doctor if you are having problems. It's also a good idea to know your test results and keep a list of the medicines you take. Where can you learn more? Go to http://cookie-mario.info/. Enter M710 in the search box to learn more about \"Low Back Arthritis: Exercises. \" Current as of: May 23, 2016 Content Version: 11.2 © 8760-7895 [employer].  Care instructions adapted under license by [address] (which disclaims liability or warranty for this information). If you have questions about a medical condition or this instruction, always ask your healthcare professional. [address] any warranty or liability for your use of this information. Low Back Pain: Exercises Your Care Instructions Here are some examples of typical rehabilitation exercises for your condition. Start each exercise slowly. Ease off the exercise if you start to have pain. Your doctor or physical therapist will tell you when you can start these exercises and which ones will work best for you. How to do the exercises Press-up 7. Lie on your stomach, supporting your body with your forearms. 8. Press your elbows down into the floor to raise your upper back. As you do this, relax your stomach muscles and allow your back to arch without using your back muscles. As your press up, do not let your hips or pelvis come off the floor. 9. Hold for 15 to 30 seconds, then relax. 10. Repeat 2 to 4 times. Alternate arm and leg (bird dog) exercise Note: Do this exercise slowly. Try to keep your body straight at all times, and do not let one hip drop lower than the other. 7. Start on the floor, on your hands and knees. 8. Tighten your belly muscles. 9. Raise one leg off the floor, and hold it straight out behind you. Be careful not to let your hip drop down, because that will twist your trunk. 10. Hold for about 6 seconds, then lower your leg and switch to the other leg. 11. Repeat 8 to 12 times on each leg. 12. Over time, work up to holding for 10 to 30 seconds each time. 13. If you feel stable and secure with your leg raised, try raising the opposite arm straight out in front of you at the same time. Knee-to-chest exercise 1. Lie on your back with your knees bent and your feet flat on the floor.  
2. Bring one knee to your chest, keeping the other foot flat on the floor (or keeping the other leg straight, whichever feels better on your lower back). 3. Keep your lower back pressed to the floor. Hold for at least 15 to 30 seconds. 4. Relax, and lower the knee to the starting position. 5. Repeat with the other leg. Repeat 2 to 4 times with each leg. 6. To get more stretch, put your other leg flat on the floor while pulling your knee to your chest. 
Curl-ups 1. Lie on the floor on your back with your knees bent at a 90-degree angle. Your feet should be flat on the floor, about 12 inches from your buttocks. 2. Cross your arms over your chest. If this bothers your neck, try putting your hands behind your neck (not your head), with your elbows spread apart. 3. Slowly tighten your belly muscles and raise your shoulder blades off the floor. 4. Keep your head in line with your body, and do not press your chin to your chest. 
5. Hold this position for 1 or 2 seconds, then slowly lower yourself back down to the floor. 6. Repeat 8 to 12 times. Pelvic tilt exercise 1. Lie on your back with your knees bent. 2. \"Brace\" your stomach. This means to tighten your muscles by pulling in and imagining your belly button moving toward your spine. You should feel like your back is pressing to the floor and your hips and pelvis are rocking back. 3. Hold for about 6 seconds while you breathe smoothly. 4. Repeat 8 to 12 times. Heel dig bridging 1. Lie on your back with both knees bent and your ankles bent so that only your heels are digging into the floor. Your knees should be bent about 90 degrees. 2. Then push your heels into the floor, squeeze your buttocks, and lift your hips off the floor until your shoulders, hips, and knees are all in a straight line. 3. Hold for about 6 seconds as you continue to breathe normally, and then slowly lower your hips back down to the floor and rest for up to 10 seconds. 4. Do 8 to 12 repetitions. Hamstring stretch in doorway 1. Lie on your back in a doorway, with one leg through the open door. 2. Slide your leg up the wall to straighten your knee. You should feel a gentle stretch down the back of your leg. 3. Hold the stretch for at least 15 to 30 seconds. Do not arch your back, point your toes, or bend either knee. Keep one heel touching the floor and the other heel touching the wall. 4. Repeat with your other leg. 5. Do 2 to 4 times for each leg. Hip flexor stretch 1. Kneel on the floor with one knee bent and one leg behind you. Place your forward knee over your foot. Keep your other knee touching the floor. 2. Slowly push your hips forward until you feel a stretch in the upper thigh of your rear leg. 3. Hold the stretch for at least 15 to 30 seconds. Repeat with your other leg. 4. Do 2 to 4 times on each side. Wall sit 1. Stand with your back 10 to 12 inches away from a wall. 2. Lean into the wall until your back is flat against it. 3. Slowly slide down until your knees are slightly bent, pressing your lower back into the wall. 4. Hold for about 6 seconds, then slide back up the wall. 5. Repeat 8 to 12 times. Follow-up care is a key part of your treatment and safety. Be sure to make and go to all appointments, and call your doctor if you are having problems. It's also a good idea to know your test results and keep a list of the medicines you take. Where can you learn more? Go to http://cookie-mario.info/. Enter S585 in the search box to learn more about \"Low Back Pain: Exercises. \" Current as of: May 23, 2016 Content Version: 11.2 © 0572-6882 [employer]. Care instructions adapted under license by [employer] (which disclaims liability or warranty for this information).  If you have questions about a medical condition or this instruction, always ask your healthcare professional. [place] [address] any warranty or liability for your use of this information. Introducing [facility] [place] [facility]! Dear Tracy Rico: Thank you for requesting a [employer] account. Our records indicate that you already have an active [employer] account. You can access your account anytime at [email or web address] Did you know that you can access your hospital and ER discharge instructions at any time in [employer]? You can also review all of your test results from your hospital stay or ER visit. Additional Information If you have questions, please visit the Frequently Asked Questions section of the [employer] website at [email or web address] Remember, [employer] is NOT to be used for urgent needs. For medical emergencies, dial 911. Now available from your iPhone and Android! Please provide this summary of care documentation to your next provider. Your primary care clinician is listed as PROVIDER UNKNOWN. If you have any questions after today's visit, please call [phone].

## 2022-05-18 ENCOUNTER — APPOINTMENT (RX ONLY)
Dept: URBAN - METROPOLITAN AREA CLINIC 339 | Facility: CLINIC | Age: 62
Setting detail: DERMATOLOGY
End: 2022-05-18

## 2022-05-18 DIAGNOSIS — D22 MELANOCYTIC NEVI: ICD-10-CM

## 2022-05-18 DIAGNOSIS — L57.0 ACTINIC KERATOSIS: ICD-10-CM

## 2022-05-18 DIAGNOSIS — L72.8 OTHER FOLLICULAR CYSTS OF THE SKIN AND SUBCUTANEOUS TISSUE: ICD-10-CM

## 2022-05-18 DIAGNOSIS — L81.4 OTHER MELANIN HYPERPIGMENTATION: ICD-10-CM

## 2022-05-18 DIAGNOSIS — D18.0 HEMANGIOMA: ICD-10-CM

## 2022-05-18 DIAGNOSIS — L82.1 OTHER SEBORRHEIC KERATOSIS: ICD-10-CM

## 2022-05-18 PROBLEM — D22.5 MELANOCYTIC NEVI OF TRUNK: Status: ACTIVE | Noted: 2022-05-18

## 2022-05-18 PROBLEM — D18.01 HEMANGIOMA OF SKIN AND SUBCUTANEOUS TISSUE: Status: ACTIVE | Noted: 2022-05-18

## 2022-05-18 PROCEDURE — 17003 DESTRUCT PREMALG LES 2-14: CPT

## 2022-05-18 PROCEDURE — ? LIQUID NITROGEN

## 2022-05-18 PROCEDURE — ? SURGICAL DECISION MAKING

## 2022-05-18 PROCEDURE — 99213 OFFICE O/P EST LOW 20 MIN: CPT | Mod: 25

## 2022-05-18 PROCEDURE — ? TREATMENT REGIMEN

## 2022-05-18 PROCEDURE — 17000 DESTRUCT PREMALG LESION: CPT

## 2022-05-18 PROCEDURE — ? COUNSELING

## 2022-05-18 ASSESSMENT — LOCATION DETAILED DESCRIPTION DERM
LOCATION DETAILED: RIGHT SUPERIOR PARIETAL SCALP
LOCATION DETAILED: EPIGASTRIC SKIN
LOCATION DETAILED: INFERIOR THORACIC SPINE
LOCATION DETAILED: LEFT SUPERIOR PARIETAL SCALP
LOCATION DETAILED: SUPERIOR THORACIC SPINE
LOCATION DETAILED: RIGHT MEDIAL UPPER BACK
LOCATION DETAILED: SUPERIOR THORACIC SPINE
LOCATION DETAILED: UPPER STERNUM

## 2022-05-18 ASSESSMENT — LOCATION ZONE DERM
LOCATION ZONE: TRUNK
LOCATION ZONE: SCALP
LOCATION ZONE: TRUNK

## 2022-05-18 ASSESSMENT — LOCATION SIMPLE DESCRIPTION DERM
LOCATION SIMPLE: UPPER BACK
LOCATION SIMPLE: RIGHT UPPER BACK
LOCATION SIMPLE: CHEST
LOCATION SIMPLE: UPPER BACK
LOCATION SIMPLE: ABDOMEN
LOCATION SIMPLE: SCALP

## 2022-05-18 NOTE — PROCEDURE: SURGICAL DECISION MAKING
Discussion: Discussed the procedure and risks in detail, to include bleeding, infection, discomfort, recurrence of cyst and scarring.
Risk Assessment Explanation (Does Not Render In The Note): Clinical determination of the probability and/or consequences of an event, such as surgery. Clinical assessment of the level of risk is affected by the nature of the event under consideration for the patient. Modifier 57 is used to indicate an Evaluation and Management (E/M) service resulted in the initial decision to perform surgery either the day before a major surgery (90 day global) or the day of a major surgery.
Initial Decision For Surgery?: No